# Patient Record
Sex: FEMALE
[De-identification: names, ages, dates, MRNs, and addresses within clinical notes are randomized per-mention and may not be internally consistent; named-entity substitution may affect disease eponyms.]

---

## 2018-04-29 ENCOUNTER — HOSPITAL ENCOUNTER (EMERGENCY)
Dept: HOSPITAL 14 - H.ER | Age: 38
Discharge: HOME | End: 2018-04-29
Payer: SELF-PAY

## 2018-04-29 VITALS
TEMPERATURE: 97.6 F | DIASTOLIC BLOOD PRESSURE: 65 MMHG | RESPIRATION RATE: 15 BRPM | SYSTOLIC BLOOD PRESSURE: 104 MMHG | HEART RATE: 76 BPM

## 2018-04-29 VITALS — OXYGEN SATURATION: 98 %

## 2018-04-29 DIAGNOSIS — R51: Primary | ICD-10-CM

## 2018-04-29 PROCEDURE — 81025 URINE PREGNANCY TEST: CPT

## 2018-04-29 PROCEDURE — 96372 THER/PROPH/DIAG INJ SC/IM: CPT

## 2018-04-29 PROCEDURE — 70450 CT HEAD/BRAIN W/O DYE: CPT

## 2018-04-29 PROCEDURE — 99283 EMERGENCY DEPT VISIT LOW MDM: CPT

## 2018-04-29 NOTE — ED PDOC
HPI:  Headache


Time Seen by Provider: 04/29/18 17:15


Chief Complaint (Nursing): Headache


Chief Complaint (Provider): Headache


History Per: Patient


History/Exam Limitations: no limitations


Onset/Duration Of Symptoms: Days (x7), Gradual


Current Symptoms Are (Timing): Still Present


Associated Symptoms: Nausea.  denies: Photophobia, Vomiting


Additional Complaint(s): 


Ellie Zee is a 38 year old female with no past medical history, who is 

presenting to the ER with complaints of posterior headache associated with 

nausea, onset 1 week ago, reports gradual in onset. Patient reports a burning 

sensation and pain to the occipital aspect of the scalp.  She reports taking 

NSAIDs with no relief of symptoms and states that she has never had a similar 

headache in the past. Patient denies any fever, neck pain, vomiting, photophobia

, urinary symptoms, and thunderclap headache. States that this is not the worse 

headache of her life.





PMD: none provided











Past Medical History


Reviewed: Historical Data, Nursing Documentation, Vital Signs


Vital Signs: 





 Last Vital Signs











Temp  98 F   04/29/18 17:09


 


Pulse  63   04/29/18 17:09


 


Resp  16   04/29/18 17:09


 


BP  137/86   04/29/18 17:09


 


Pulse Ox  98   04/29/18 17:09














- Medical History


PMH: No Chronic Diseases





- Surgical History


Surgical History: No Surg Hx





- Family History


Family History: States: CAD (father's side)





- Home Medications


Home Medications: 


 Ambulatory Orders











 Medication  Instructions  Recorded


 


Cyclobenzaprine [Cyclobenzaprine 10 mg PO TID PRN #15 tab 04/29/18





HCl]  


 


Metoclopramide HCl [Reglan] 10 mg PO QID PRN #20 tablet 04/29/18














- Allergies


Allergies/Adverse Reactions: 


 Allergies











Allergy/AdvReac Type Severity Reaction Status Date / Time


 


No Known Allergies Allergy   Verified 04/29/18 17:09














Review of Systems


ROS Statement: Except As Marked, All Systems Reviewed And Found Negative


Constitutional: Negative for: Fever


Eyes: Negative for: Other (photophobia)


Gastrointestinal: Positive for: Nausea.  Negative for: Vomiting


Genitourinary Female: Negative for: Other (urinary symptoms)


Musculoskeletal: Negative for: Neck Pain


Neurological: Positive for: Headache





Physical Exam





- Reviewed


Nursing Documentation Reviewed: Yes


Vital Signs Reviewed: Yes





- Physical Exam


Comments: 


GENERAL APPEARANCE: Patient is awake, alert, oriented x 3, in mild to moderate 

painful distress. Patient tearful. 


SKIN:  Warm, dry; (-) cyanosis; (-) rash.


HEAD:  (-) scalp swelling, (+) tenderness to palpation to the occipital scalp 

by the hairline, (-) temporal artery tenderness.


EYES:  (-) conjunctival pallor, (-) scleral icterus.


ENMT:  (-) sinus tenderness; mucous membranes are moist.


NECK:  FROM, supple, (-) tenderness, (-) stiffness, (-) meningismus, (-) 

lymphadenopathy. Patient freely moving her neck, nodding and shaking her head 

without any noted increase in discomfort. 


CHEST AND RESPIRATORY:  (-) rales, (-) rhonchi, (-) wheezes; breath sounds 

equal bilaterally.


HEART AND CARDIOVASCULAR:  (-) irregularity; (-) murmur, (-) gallop.


ABDOMEN AND GI:  Soft; (-) tenderness.


EXTREMITIES:  (-) deformity.


NEURO AND PSYCH:  Mental status as above.  CNs:  Pupils equal and reactive; EOMI

; (-) facial asymmetry; tongue and uvula midline.  Strength and DTRs symmetric.

  

















- ECG


O2 Sat by Pulse Oximetry: 98 (RA)


Pulse Ox Interpretation: Normal





Medical Decision Making


Medical Decision Making: 


Time: 17:45


Plan:


--CT Head


--Flexeril 10 mg PO


--Reglan 10 mg IM


--cg 





Uhcg (-)





CT head w/o contrast: 


FINDINGS:


BRAIN: No significant acute abnormality identified. No acute hemorrhage seen 

within the brain. No


acute extra-axial fluid collections visualized. No evidence of significant mass 

effect within the brain.


Normal gray-white matter differentiation.


VENTRICLES: No evidence of significant hydrocephalus.


BONES/JOINTS: No acute fractures or other acute bony abnormality noted.


SOFT TISSUES: No acute abnormality of the visualized soft tissues is seen.


SINUSES: Visualized paranasal sinuses appear clear.


MASTOID AIR CELLS: Mastoid air cells appear clear.


IMPRESSION:


- No acute findings seen within the brain.


- See above for remaining findings.





Dictated and Authenticated by: Flory Isbell MD


04/29/2018 7:58 PM Eastern Time ([place] & [place])





On reevaluation, patient is resting comfortably in a chair in no acute 

distress. Patient reports significant improvement of her pain. Reports no nausea

, dizziness, severe headache, chest or any other complaints at this time. On 

exam, patient remains alert oriented 3, neck is supple with full range of 

motion, repeat neuro exam shows no acute focal findings. CT results discussed 

with the patient in great detail. Diagnosis of likely muscle spasm discussed 

with the patient. Patient is in good spirits and feels comfortable with plan 

for d/c home. VSS.





Advised to follow up with primary care physician in 1-2 days without fail. 

Advised to take medication as prescribed. Return to the emergency room at any 

time for any new or worsening symptoms.





Patient states she fully agrees with and understands discharge instructions. 

States that she agrees with the plan and disposition. Verbalized and repeated 

discharge instructions and plan. I have given the patient opportunity to ask 

any additional questions.


--------------------------------------------------------------------------------

---------------------------------------


Scribe Attestation:


Documented by Bee Ortega, acting as a scribe for Tanja Suh PA-C.





Provider Scribe Attestation:


All medical record entries made by the Scribe were at my direction and 

personally dictated by me. I have reviewed the chart and agree that the record 

accurately reflects my personal performance of the history, physical exam, 

medical decision making, and the department course for this patient. I have 

also personally directed, reviewed, and agree with the discharge instructions 

and disposition.











Disposition





- Clinical Impression


Clinical Impression: 


 Headache








- Patient ED Disposition


Is Patient to be Admitted: No


Counseled Patient/Family Regarding: Studies Performed, Diagnosis, Need For 

Followup, Rx Given





- Disposition


Disposition: Routine/Home


Disposition Time: 20:40


Condition: IMPROVED


Additional Instructions: 


Thank you for letting us take care of you today. You were treated for headache. 

The emergency medical care you received today was directed at your acute 

symptoms. If you were prescribed any medication, please fill it and take as 

directed. It may take several days for your symptoms to resolve. Return to the 

Emergency Department if your symptoms worsen, do not improve, or if you have 

any other problems.





Please call one of the physicians/clinics you have been referred to that are 

listed on the Patient Visit Information form that is included in your discharge 

packet. Bring any paperwork you were given at discharge with you along with any 

medications you are taking to your follow up visit. Our treatment cannot 

replace ongoing medical care by a primary care provider (PCP) outside of the 

emergency department.





Thank you for allowing the [facility] team to be part of your care today.








If you had a CT scan: A Radiologist will review the ED reading if any change in 

treatment is needed we will contact you.***





Prescriptions: 


Cyclobenzaprine [Cyclobenzaprine HCl] 10 mg PO TID PRN #15 tab


 PRN Reason: Muscle Spasm


Metoclopramide HCl [Reglan] 10 mg PO QID PRN #20 tablet


 PRN Reason: Headache


Instructions:  Headache, Adult


Forms:  [employer] (English), [facility] ED School/Work Excuse


Print Language: Norwegian





- PA / NP / Resident Statement


MD/DO has reviewed & agrees with the documentation as recorded.

## 2018-04-29 NOTE — CT
EXAM:

  CT Head Without Intravenous Contrast



EXAM DATE/TIME:

  4/29/2018 6:10 PM



CLINICAL HISTORY:

  38 years old, female; Pain; Headache; Headache not specified



TECHNIQUE:

  Axial computed tomography images of the head/brain without intravenous 

contrast.  All CT scans at this facility use one or more dose reduction 

techniques, viz.: automated exposure control; ma/kV adjustment per patient size 

(including targeted exams where dose is matched to indication; i.e. head); or 

iterative reconstruction technique.

  Coronal and sagittal reformatted images were created and reviewed.



COMPARISON:

  No relevant prior studies available.



FINDINGS:

  BRAIN:  No significant acute abnormality identified.  No acute hemorrhage 

seen within the brain.  No acute extra-axial fluid collections visualized.  No 

evidence of significant mass effect within the brain. Normal gray-white matter 

differentiation.

  VENTRICLES:  No evidence of significant hydrocephalus.

  BONES/JOINTS:  No acute fractures or other acute bony abnormality noted.

  SOFT TISSUES:  No acute abnormality of the visualized soft tissues is seen.

  SINUSES:  Visualized paranasal sinuses appear clear.

  MASTOID AIR CELLS:  Mastoid air cells appear clear.



IMPRESSION:     

- No acute findings seen within the brain.

- See above for remaining findings.

## 2018-08-18 ENCOUNTER — HOSPITAL ENCOUNTER (EMERGENCY)
Dept: HOSPITAL 14 - H.ER | Age: 38
LOS: 1 days | Discharge: HOME | End: 2018-08-19
Payer: SELF-PAY

## 2018-08-18 VITALS — OXYGEN SATURATION: 99 %

## 2018-08-18 DIAGNOSIS — N39.0: Primary | ICD-10-CM

## 2018-08-18 DIAGNOSIS — N93.9: ICD-10-CM

## 2018-08-18 PROCEDURE — 81025 URINE PREGNANCY TEST: CPT

## 2018-08-18 PROCEDURE — 96372 THER/PROPH/DIAG INJ SC/IM: CPT

## 2018-08-18 PROCEDURE — 87086 URINE CULTURE/COLONY COUNT: CPT

## 2018-08-18 PROCEDURE — 99283 EMERGENCY DEPT VISIT LOW MDM: CPT

## 2018-08-18 NOTE — ED PDOC
HPI: Female  Pain


Time Seen by Provider: 08/18/18 23:32


Chief Complaint (Nursing): Female Genitourinary


History Per: Patient,  ([place] #[id number])


Additional Complaint(s): 





Pt. states at approximately 4308-7375 today she began to develop suprapubic 

cramping and shortly after she developed dysuria, hematuria, frequency, 

urgency. Pain has progressively worsened. Reports experiencing back pain only 

when she is urinating and it is localized to the lower back. Denies fever, N/V/D

, abdominal pain, flank pain, hx of kidney stones, vaginal bleeding. 


Abnormal Vaginal Bleeding: Yes


Last Menstral Period: 8/9/2018





Past Medical History


Reviewed: Historical Data, Nursing Documentation, Vital Signs


Vital Signs: 





 Last Vital Signs











Temp  99.0 F   08/18/18 23:27


 


Pulse  90   08/18/18 23:27


 


Resp  16   08/18/18 23:27


 


BP  117/80   08/18/18 23:27


 


Pulse Ox  99   08/18/18 23:27














- Family History


Family History: States: CAD (father's side)





- Home Medications


Home Medications: 


 Ambulatory Orders











 Medication  Instructions  Recorded


 


Cyclobenzaprine [Cyclobenzaprine 10 mg PO TID PRN #15 tab 04/29/18





HCl]  


 


Metoclopramide HCl [Reglan] 10 mg PO QID PRN #20 tablet 04/29/18


 


Nitrofurantoin Macrocrystals 100 mg PO BID #14 cap 08/18/18





[Macrobid]  


 


Phenazopyridine [Pyridium] 200 mg PO BID #6 tab 08/18/18














- Allergies


Allergies/Adverse Reactions: 


 Allergies











Allergy/AdvReac Type Severity Reaction Status Date / Time


 


No Known Allergies Allergy   Verified 08/18/18 23:27














Review of Systems


ROS Statement: Except As Marked, All Systems Reviewed And Found Negative


Genitourinary Female: Positive for: Dysuria, Frequency, Hematuria, Pelvic Pain.

  Negative for: Incontinence





Physical Exam





- Physical Exam


Appears: Positive for: Well, Non-toxic, In Acute Distress (mild painful distress

).  Negative for: Uncomfortable


Skin: Positive for: Normal Color, Warm.  Negative for: Rash


Gastrointestinal/Abdominal: Positive for: Normal Exam, Soft.  Negative for: 

Tenderness


Pelvic Exam: Positive for: Other (mild suprapubic tenderness)


Back: Positive for: Normal Inspection.  Negative for: L CVA Tenderness, R CVA 

Tenderness


Neurologic/Psych: Positive for: Alert, Oriented.  Negative for: Aphasia, Facial 

Droop





- Laboratory Results


Urine Pregnancy POC: Negative


Urine dip results: Positive for: Leukocyte Esterase (trace), Blood (large), 

Nitrate (positive), Ketones (trace), Bilirubin (small), Protein (1).  Negative 

for: Glucose





- ECG


O2 Sat by Pulse Oximetry: 99





- Progress


ED Course And Treament: 





Urine dip, urine HCG, urine C&S, pyridium 200mg PO, toradol 30mg IM ordered.





8/19/2018 1218


Called patient using  #[phone]


Pt. states she began taking antibiotics just now. Has been taking Pyridium and 

now her urine is orange. Reports pain is still present but has improved from 

last night. Denies back pain, flank pain, fever. After taking abx pt. began to 

feel nauseous but no vomiting. Advised to return to ED immediately if fever, 

back/flank pain, vomiting, or if symptoms worse. Pt verbalized understanding of 

care. All questions answered. Pt. is happy with care. 





Disposition





- Clinical Impression


Clinical Impression: 


 Urinary tract infection








- Patient ED Disposition


Is Patient to be Admitted: No





- Disposition


Referrals: 


[facility] [Outside]


Disposition: Routine/Home


Disposition Time: 23:57


Condition: IMPROVED


Additional Instructions: 





GABRIELLE GARDINER, thank you for letting us take care of you today. Your provider 

was Tessa Cannon MD and you were treated for BLOOD IN URINE. The 

emergency medical care you received today was directed at your acute symptoms. 

If you were prescribed any medication, please fill it and take as directed. It 

may take several days for your symptoms to resolve. Return to the Emergency 

Department if your symptoms worsen, do not improve, or if you have any other 

problems.





Please contact your doctor or call one of the physicians/clinics you have been 

referred to that are listed on the Patient Visit Information form that is 

included in your discharge packet. Bring any paperwork you were given at 

discharge with you along with any medications you are taking to your follow up 

visit. Our treatment cannot replace ongoing medical care by a primary care 

provider outside of the emergency department.





Thank you for allowing the [facility] team to be part of your care today.








If you had an X-Ray or CT scan: A Radiologist will review the ED reading if any 

change in treatment is needed we will contact you.***





If you had a blood, urine, or wound culture: It will take several days for the 

results, if any change in treatment is needed we will contact you.***





If you had an STI test: It will take 48 hours for the results. Please call 

after 1 week if you have not heard back.***


Prescriptions: 


Nitrofurantoin Macrocrystals [Macrobid] 100 mg PO BID #14 cap


Phenazopyridine [Pyridium] 200 mg PO BID #6 tab


Instructions:  Urinary Tract Infection, Adult (DC)


Forms:  [employer] (British)


Print Language: Bulgarian

## 2018-08-19 VITALS
DIASTOLIC BLOOD PRESSURE: 64 MMHG | RESPIRATION RATE: 18 BRPM | HEART RATE: 81 BPM | TEMPERATURE: 98.3 F | SYSTOLIC BLOOD PRESSURE: 111 MMHG

## 2018-08-23 ENCOUNTER — HOSPITAL ENCOUNTER (EMERGENCY)
Dept: HOSPITAL 14 - H.ER | Age: 38
LOS: 1 days | Discharge: HOME | End: 2018-08-24
Payer: SELF-PAY

## 2018-08-23 DIAGNOSIS — R11.0: Primary | ICD-10-CM

## 2018-08-23 PROCEDURE — 96372 THER/PROPH/DIAG INJ SC/IM: CPT

## 2018-08-23 PROCEDURE — 99284 EMERGENCY DEPT VISIT MOD MDM: CPT

## 2018-08-23 PROCEDURE — 81025 URINE PREGNANCY TEST: CPT

## 2018-08-23 NOTE — ED PDOC
HPI:Nausea, Vomiting, Diarrhea


Time Seen by Provider: 08/23/18 22:20


Chief Complaint (Nursing): GI Problem


Chief Complaint (Provider): nausea


History Per: Patient


History/Exam Limitations: no limitations


Onset/Duration Of Symptoms: Days (3 days)


Current Symptoms Are (Timing): Still Present


Associated Symptoms: Nausea, Loss Of Appetite.  denies: Fever, Chills, Vomiting

, Diarrhea, Back Pain, Chest Pain, Urinary Symptoms


Additional Complaint(s): 





Seen in this ER on Saturday and treated for UTI w macrobid. c/o excessive 

salivation and nausea for 3 days, constantly having to spit. No vomiting. +loss 

of appetite.


Urinary symptoms have improved.


No fever/chills/back pain.


Also reports that for 3-4 months, has been having pelvic pain: reports that she 

constantly feels like a small snake is moving around in the LEFT pelvic area. 

She reports occasional dyspareunia. No abnormal vaginal bleeding or discharge. 

She has not follow up with a gynecologist in 3 year. She reports normal period.





PMD Antelmo Gallegos





Past Medical History


Reviewed: Historical Data, Nursing Documentation, Vital Signs


Vital Signs: 


 Last Vital Signs











Temp  98.6 F   08/23/18 21:38


 


Pulse  87   08/23/18 21:38


 


Resp  17   08/23/18 21:38


 


BP  155/109 H  08/23/18 21:38


 


Pulse Ox  99   08/23/18 21:38














- Medical History


PMH: No Chronic Diseases





- Family History


Family History: States: CAD (father's side)





- Social History


Current smoker - smoking cessation education provided: No





- Home Medications


Home Medications: 


 Ambulatory Orders











 Medication  Instructions  Recorded


 


Cyclobenzaprine [Cyclobenzaprine 10 mg PO TID PRN #15 tab 04/29/18





HCl]  


 


Metoclopramide HCl [Reglan] 10 mg PO QID PRN #20 tablet 04/29/18


 


Nitrofurantoin Macrocrystals 100 mg PO BID #14 cap 08/18/18





[Macrobid]  


 


Phenazopyridine [Pyridium] 200 mg PO BID #6 tab 08/18/18


 


Famotidine [Pepcid] 40 mg PO DAILY PRN #30 tab 08/23/18


 


Ondansetron ODT [Zofran ODT] 1 odt PO Q6 PRN #20 odt 08/23/18














- Allergies


Allergies/Adverse Reactions: 


 Allergies











Allergy/AdvReac Type Severity Reaction Status Date / Time


 


No Known Allergies Allergy   Verified 08/18/18 23:27














Review of Systems


ROS Statement: Except As Marked, All Systems Reviewed And Found Negative (and 

as per HPI)


Gastrointestinal: Positive for: Nausea.  Negative for: Vomiting, Diarrhea, 

Constipation


Genitourinary Female: Positive for: Pelvic Pain.  Negative for: Dysuria, 

Frequency, Vaginal Discharge, Vaginal Bleeding





Physical Exam





- Reviewed


Nursing Documentation Reviewed: Yes


Vital Signs Reviewed: Yes





- Physical Exam


Appears: Positive for: Non-toxic, No Acute Distress


Head Exam: Positive for: ATRAUMATIC, NORMOCEPHALIC


Skin: Positive for: Warm, Dry


ENT: Positive for: Other (moist mucus membranes)


Neck: Positive for: Painless ROM


Respiratory: Negative for: Respiratory Distress


Gastrointestinal/Abdominal: Positive for: Soft.  Negative for: Tenderness, Mass

, Distended, Guarding


Pelvic Exam: Positive for: External Exam Normal, Speculum Exam Normal, Bimanual 

Exam Normal, No Cerv. Motion Tender, Other (EDT [place] assisting at bedside).  

Negative for: Active Bleeding


Back: Positive for: Normal Inspection.  Negative for: L CVA Tenderness, R CVA 

Tenderness


Lymphatic: Negative for: Inguinal Node Tenderness





- ECG


O2 Sat by Pulse Oximetry: 99





- Progress


Re-evaluation Time: 23:45


Condition: Improved (decreased spitting and nausea)





Disposition





- Clinical Impression


Clinical Impression: 


 Nausea





Counseled Patient/Family Regarding: Studies Performed, Diagnosis, Need For 

Followup





- Disposition


[facility]errals[facility] 


[facility]


Disposition: Routine/Home


Disposition Time: 23:54


Condition: IMPROVED


Prescriptions: 


Famotidine [Pepcid] 40 mg PO DAILY PRN #30 tab


 PRN Reason: reflux


Ondansetron ODT [Zofran ODT] 1 odt PO Q6 PRN #20 odt


 PRN Reason: Nausea/Vomiting


Instructions:  Gastritis (DC), Nausea and Vomiting, Adult (DC)


Print Language: Amharic

## 2018-08-24 VITALS — TEMPERATURE: 98.2 F | DIASTOLIC BLOOD PRESSURE: 74 MMHG | HEART RATE: 68 BPM | SYSTOLIC BLOOD PRESSURE: 126 MMHG

## 2018-08-24 VITALS — OXYGEN SATURATION: 100 % | RESPIRATION RATE: 18 BRPM
